# Patient Record
Sex: MALE | Race: WHITE | NOT HISPANIC OR LATINO
[De-identification: names, ages, dates, MRNs, and addresses within clinical notes are randomized per-mention and may not be internally consistent; named-entity substitution may affect disease eponyms.]

---

## 2018-11-27 PROBLEM — Z00.00 ENCOUNTER FOR PREVENTIVE HEALTH EXAMINATION: Status: ACTIVE | Noted: 2018-11-27

## 2018-11-28 ENCOUNTER — APPOINTMENT (OUTPATIENT)
Dept: OTOLARYNGOLOGY | Facility: CLINIC | Age: 53
End: 2018-11-28
Payer: MEDICAID

## 2018-11-28 VITALS
DIASTOLIC BLOOD PRESSURE: 76 MMHG | SYSTOLIC BLOOD PRESSURE: 113 MMHG | OXYGEN SATURATION: 96 % | TEMPERATURE: 98.1 F | HEART RATE: 63 BPM

## 2018-11-28 VITALS — BODY MASS INDEX: 20.96 KG/M2 | HEIGHT: 67.72 IN | WEIGHT: 136.69 LBS

## 2018-11-28 DIAGNOSIS — R07.0 PAIN IN THROAT: ICD-10-CM

## 2018-11-28 DIAGNOSIS — J31.2 CHRONIC PHARYNGITIS: ICD-10-CM

## 2018-11-28 DIAGNOSIS — K21.9 GASTRO-ESOPHAGEAL REFLUX DISEASE W/OUT ESOPHAGITIS: ICD-10-CM

## 2018-11-28 DIAGNOSIS — Z78.9 OTHER SPECIFIED HEALTH STATUS: ICD-10-CM

## 2018-11-28 DIAGNOSIS — Z72.0 TOBACCO USE: ICD-10-CM

## 2018-11-28 PROCEDURE — 99203 OFFICE O/P NEW LOW 30 MIN: CPT | Mod: 25

## 2018-11-28 PROCEDURE — 31575 DIAGNOSTIC LARYNGOSCOPY: CPT

## 2018-11-30 ENCOUNTER — FORM ENCOUNTER (OUTPATIENT)
Age: 53
End: 2018-11-30

## 2018-12-01 ENCOUNTER — OUTPATIENT (OUTPATIENT)
Dept: OUTPATIENT SERVICES | Facility: HOSPITAL | Age: 53
LOS: 1 days | End: 2018-12-01
Payer: COMMERCIAL

## 2018-12-01 ENCOUNTER — APPOINTMENT (OUTPATIENT)
Dept: CT IMAGING | Facility: HOSPITAL | Age: 53
End: 2018-12-01
Payer: MEDICAID

## 2018-12-01 PROCEDURE — 70486 CT MAXILLOFACIAL W/O DYE: CPT | Mod: 26

## 2018-12-01 PROCEDURE — 70486 CT MAXILLOFACIAL W/O DYE: CPT

## 2018-12-04 ENCOUNTER — APPOINTMENT (OUTPATIENT)
Dept: OTOLARYNGOLOGY | Facility: CLINIC | Age: 53
End: 2018-12-04
Payer: MEDICAID

## 2018-12-04 VITALS
TEMPERATURE: 98.1 F | OXYGEN SATURATION: 95 % | RESPIRATION RATE: 15 BRPM | SYSTOLIC BLOOD PRESSURE: 124 MMHG | DIASTOLIC BLOOD PRESSURE: 78 MMHG | HEART RATE: 70 BPM

## 2018-12-04 DIAGNOSIS — J34.2 DEVIATED NASAL SEPTUM: ICD-10-CM

## 2018-12-04 DIAGNOSIS — J31.0 CHRONIC RHINITIS: ICD-10-CM

## 2018-12-04 PROCEDURE — 99213 OFFICE O/P EST LOW 20 MIN: CPT | Mod: 25

## 2018-12-04 PROCEDURE — 31231 NASAL ENDOSCOPY DX: CPT

## 2022-09-06 ENCOUNTER — APPOINTMENT (OUTPATIENT)
Dept: OTOLARYNGOLOGY | Facility: CLINIC | Age: 57
End: 2022-09-06

## 2022-09-06 DIAGNOSIS — R07.0 PAIN IN THROAT: ICD-10-CM

## 2022-09-06 DIAGNOSIS — J30.0 VASOMOTOR RHINITIS: ICD-10-CM

## 2022-09-06 PROCEDURE — 99204 OFFICE O/P NEW MOD 45 MIN: CPT | Mod: 25

## 2022-09-06 PROCEDURE — 31231 NASAL ENDOSCOPY DX: CPT

## 2022-09-06 RX ORDER — MOMETASONE 50 UG/1
50 SPRAY, METERED NASAL TWICE DAILY
Qty: 2 | Refills: 3 | Status: COMPLETED | COMMUNITY
Start: 2018-11-28 | End: 2022-09-06

## 2022-09-06 RX ORDER — AZELASTINE HYDROCHLORIDE 137 UG/1
137 SPRAY, METERED NASAL TWICE DAILY
Qty: 1 | Refills: 1 | Status: ACTIVE | COMMUNITY
Start: 2022-09-06 | End: 1900-01-01

## 2022-09-06 NOTE — ASSESSMENT
[FreeTextEntry1] : He does have what looks like postnasal drip.  I will prescribe Astelin.\par There is no evidence of a lesion to explain his throat discomfort.  I believe this is secondary to postnasal drip.\par \par As far as his migraines are concerned he was explained to him that he needed to follow-up with his migraine doctor.  There is no evidence of an infection.

## 2022-09-06 NOTE — HISTORY OF PRESENT ILLNESS
[de-identified] : Initial visit, the visit was done using a [place]  over the phone.\par \par He begins his history by reporting that he generally gets migraine headaches a few times a month.  He generally takes a "pill" and the headache is controlled.  More recently headaches have progressed.  He also reports intermittent sore throat.  He also notes postnasal drip.\par \par He quit smoking 5 years ago.\par \par He does not report unexplained weight loss, hemoptysis or change in voice.

## 2022-09-06 NOTE — PROCEDURE
[FreeTextEntry6] : PROCEDURE NOTES\par \par PROCEDURE: Nasal endoscopy \par SURGEON: Dr. Cerna\par INDICATIONS: Assess for chronic sinusitis. \par ANESTHESIA: The patient was placed in a sitting position.  Following application of the topical anesthetic and decongestant, exam was performed with a zero degree endoscope.  The scope was passed along the right nasal floor to the nasopharynx.  It was then passed into the region of the middle meatus, middle turbinate, and sphenoethmoid region.  An identical procedure was performed on the left side.  The following findings were noted:\par \par The nasal mucosa was healthy appearing and the septum was roughly midline. The middle meatus and sphenoethmoid recesses were clear bilaterally. The nasopharynx was normal. \par   [de-identified] : PROCEDURE: Flexible laryngoscopy\par SURGEON: Dr. Cerna\par INDICATIONS: He was unable to tolerate a mirror exam. Assess for laryngopharynx pharyngeal reflux. cough. head and neck mass. \par ANESTHESIA: The patient was placed in a sitting position.  Following application of the topical anesthetic and decongestant, exam was performed with a flexible scope.  The scope was passed along the right nasal floor to the nasopharynx.  It was then passed into the region of the middle meatus, middle turbinate, and sphenoethmoid region.  An identical procedure was performed on the left side.  The following findings were noted:\par \par The nasal musoca was healthy appearing and the septum was roughly midline. The middle meatus and sphenoethmoid recesses were clear bilaterally. The nasopharynx \par \par Nasopharynx: no masses, choanae patent, no adenoid tissue\par \par Base of tongue/vallecula: no masses or asymmetry\par Pharyngeal walls: symmetrical. No masses.\par Pyriform sinuses: no lesions or pooling of secretions.\par Epiglottis: normal. No edema or lesions.\par Aryepiglottic folds: normal. No lesions. \par Vocal cords: clear and mobile. No lesions. Airway patent.\par Arytenoids: no edema or erythema. \par Interarytenoid area: no edema, erythema or lesion.\par

## 2025-02-10 ENCOUNTER — APPOINTMENT (OUTPATIENT)
Dept: SURGERY | Facility: CLINIC | Age: 60
End: 2025-02-10

## 2025-02-20 ENCOUNTER — APPOINTMENT (OUTPATIENT)
Dept: SURGERY | Facility: CLINIC | Age: 60
End: 2025-02-20

## 2025-02-20 ENCOUNTER — NON-APPOINTMENT (OUTPATIENT)
Age: 60
End: 2025-02-20

## 2025-02-20 VITALS
OXYGEN SATURATION: 96 % | BODY MASS INDEX: 24.59 KG/M2 | WEIGHT: 144 LBS | HEIGHT: 64 IN | SYSTOLIC BLOOD PRESSURE: 120 MMHG | DIASTOLIC BLOOD PRESSURE: 77 MMHG | HEART RATE: 74 BPM

## 2025-02-20 DIAGNOSIS — K59.00 CONSTIPATION, UNSPECIFIED: ICD-10-CM

## 2025-02-20 DIAGNOSIS — Z87.891 PERSONAL HISTORY OF NICOTINE DEPENDENCE: ICD-10-CM

## 2025-02-20 DIAGNOSIS — Z78.9 OTHER SPECIFIED HEALTH STATUS: ICD-10-CM

## 2025-02-20 DIAGNOSIS — K40.90 UNILATERAL INGUINAL HERNIA, W/OUT OBSTRUCTION OR GANGRENE, NOT SPECIFIED AS RECURRENT: ICD-10-CM

## 2025-02-20 DIAGNOSIS — R19.09 OTHER INTRA-ABDOMINAL AND PELVIC SWELLING, MASS AND LUMP: ICD-10-CM

## 2025-02-20 PROCEDURE — 99203 OFFICE O/P NEW LOW 30 MIN: CPT

## 2025-02-20 RX ORDER — OMEPRAZOLE 40 MG/1
40 CAPSULE, DELAYED RELEASE ORAL
Refills: 0 | Status: ACTIVE | COMMUNITY

## 2025-02-27 ENCOUNTER — APPOINTMENT (OUTPATIENT)
Dept: ULTRASOUND IMAGING | Facility: CLINIC | Age: 60
End: 2025-02-27

## 2025-02-27 PROBLEM — K40.90 RIGHT INGUINAL HERNIA: Status: ACTIVE | Noted: 2025-02-27

## 2025-03-03 ENCOUNTER — APPOINTMENT (OUTPATIENT)
Dept: SURGERY | Facility: CLINIC | Age: 60
End: 2025-03-03

## 2025-05-22 ENCOUNTER — APPOINTMENT (OUTPATIENT)
Dept: SURGERY | Facility: CLINIC | Age: 60
End: 2025-05-22